# Patient Record
(demographics unavailable — no encounter records)

---

## 2025-03-06 NOTE — HISTORY OF PRESENT ILLNESS
[FreeTextEntry1] : Franny is [id number] female Factor V, preeclampsia at 24 weeks years ago now 13 weeks gestation via IVF. Gained over 10 pounds so far. Starting to go to the gym and eat healthier. NBP home 120s

## 2025-03-06 NOTE — DISCUSSION/SUMMARY
[FreeTextEntry1] : The patient is a 41-year-old female Factor V, preeclampsia at 24 weeks 4 years ago now 13 weeks gestation who is doing well. #1 CV- nl ECG, ECHO and exercise stress test normal  #2 HTN- blood pressure normal #3 Heme- c/w aspirin for both Factor V and preeclampsia #4 Ob- 13 weeks gestation, c/w prenatal, aspirin, folic acid

## 2025-03-06 NOTE — HISTORY OF PRESENT ILLNESS
[FreeTextEntry1] : Franny is [id number] female Factor V, preeclampsia at 24 weeks years ago now 13 weeks gestation via IVF. Gained over 10 pounds so far. Starting to go to the gym and eat healthier. NBP home 120s

## 2025-03-06 NOTE — HISTORY OF PRESENT ILLNESS
[FreeTextEntry1] : Franny is [id number] female Factor V, preeclampsia at 24 weeks years ago now 13 weeks gestation via IVF. Gained over 10 pounds so far. Starting to go to the gym and eat healthier. NBP home 120s

## 2025-03-11 NOTE — ASSESSMENT
[FreeTextEntry1] : #HCM - Patient presenting to [facility] care, had annual CPE 11/2024 - Received flu vaccine this season - Up to date with pap smear - Labs recently checked with OBGYN no need to repeat at this time   #Low back pain - Reports discomfort from IVF injections in low back  - No abnormalities found on PE - Suspect MSK etiology given that this happens while being active could be increased back pain from pregnancy - Can use band to help off load   #Seasonal allergies - Can use OTC Claritin or Zyrtec

## 2025-03-11 NOTE — PHYSICAL EXAM
[No Acute Distress] : no acute distress [Well-Appearing] : well-appearing [Normal Outer Ear/Nose] : the outer ears and nose were normal in appearance [Normal Oropharynx] : the oropharynx was normal [No Lymphadenopathy] : no lymphadenopathy [Supple] : supple [Thyroid Normal, No Nodules] : the thyroid was normal and there were no nodules present [No Respiratory Distress] : no respiratory distress  [Clear to Auscultation] : lungs were clear to auscultation bilaterally [Normal Rate] : normal rate  [Regular Rhythm] : with a regular rhythm [Normal S1, S2] : normal S1 and S2 [No Spinal Tenderness] : no spinal tenderness [Grossly Normal Strength/Tone] : grossly normal strength/tone [No Rash] : no rash [Coordination Grossly Intact] : coordination grossly intact [No Focal Deficits] : no focal deficits [Normal Gait] : normal gait [Normal Affect] : the affect was normal [Normal Insight/Judgement] : insight and judgment were intact

## 2025-03-11 NOTE — HISTORY OF PRESENT ILLNESS
[FreeTextEntry1] : Establish care [de-identified] : 43yo female with PMH of Factor V Leiden mutation, preeclampsia who presents to the office to establish care.   Previously followed with PMD Dr. Crum, had physical 11/2024. Previous PMD retired.   Patient currently at 14 weeks gestation via IVF. Following with MFM and cardiologist.  Previous pregnancy complicated by preeclampsia, delivered at 26 weeks.   She is concerned about some linger injection site discomfort on her lower back from her IVF process. Notes that she feels like there is a "bruise." Notices when she is walking.  She reports worsening allergies. Inquiring what allergy medication to take.

## 2025-03-11 NOTE — REVIEW OF SYSTEMS
[Nasal Discharge] : nasal discharge [Postnasal Drip] : postnasal drip [Frequency] : frequency [Fever] : no fever [Chills] : no chills [Vision Problems] : no vision problems [Earache] : no earache [Sore Throat] : no sore throat [Chest Pain] : no chest pain [Palpitations] : no palpitations [Shortness Of Breath] : no shortness of breath [Cough] : no cough [Abdominal Pain] : no abdominal pain [Nausea] : no nausea [Constipation] : no constipation [Diarrhea] : diarrhea [Vomiting] : no vomiting [Dysuria] : no dysuria [Itching] : no itching [Mole Changes] : no mole changes [Skin Rash] : no skin rash [Headache] : no headache [Dizziness] : no dizziness

## 2025-03-11 NOTE — HEALTH RISK ASSESSMENT
[Good] : ~his/her~  mood as  good [No] : In the past 12 months have you used drugs other than those required for medical reasons? No [Patient reported PAP Smear was normal] : Patient reported PAP Smear was normal [With Family] : lives with family [Employed] : employed [] :  [# Of Children ___] : has [unfilled] children [Smoke Detector] : smoke detector [Carbon Monoxide Detector] : carbon monoxide detector [Seat Belt] :  uses seat belt [Sunscreen] : uses sunscreen [Never] : Never [de-identified] : walking with her dog; wants to increase activity by going to the gym  [de-identified] : balanced diet [Reports changes in hearing] : Reports no changes in hearing [Reports changes in vision] : Reports no changes in vision [Reports changes in dental health] : Reports no changes in dental health [PapSmearDate] : 12/24 [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

## 2025-03-14 NOTE — PHYSICAL EXAM
[Normal Breath Sounds] : Normal breath sounds [Normal Heart Sounds] : normal heart sounds [Alert] : alert [Oriented to Person] : oriented to person [Oriented to Place] : oriented to place [Oriented to Time] : oriented to time [Calm] : calm [de-identified] : WNL [de-identified] : WNL [de-identified] : MAYRAL [de-identified] : Soft, nontender, gravid uterus.  No palpable umbilical or incisional hernia. [de-identified] : WNL ROM [de-identified] : WNL

## 2025-03-14 NOTE — HISTORY OF PRESENT ILLNESS
[de-identified] : Franny is a 42 yr old female here for a consultation for Umbilical Hernia.   Is currently 15 weeks pregnant.   CT A+P on 7/9/21 - 3.6 cm complex left adnexal cyst with septations and rim calcification. Small amount of surrounding fluid. Recommend pelvic ultrasound. Trace fat-containing umbilical hernia. Ascending colonic wall thickening secondary to under distention or colitis.  Today pt reports no pain. Normal BMs, denies constipation or diarrhea. Denies nausea and vomiting. Good appetite. Doesnt see or feel a bulge and denies discomfort from area. Takes baby asprin.

## 2025-03-14 NOTE — PHYSICAL EXAM
[Normal Breath Sounds] : Normal breath sounds [Normal Heart Sounds] : normal heart sounds [Alert] : alert [Oriented to Person] : oriented to person [Oriented to Place] : oriented to place [Oriented to Time] : oriented to time [Calm] : calm [de-identified] : WNL [de-identified] : WNL [de-identified] : MAYRAL [de-identified] : Soft, nontender, gravid uterus.  No palpable umbilical or incisional hernia. [de-identified] : WNL ROM [de-identified] : WNL

## 2025-03-14 NOTE — ASSESSMENT
[FreeTextEntry1] : #Tick bite - Presenting for second opinion on tick bite - Walking through park yesterday evening, found tick on her abdomen this morning - Went to Urgent Care - tick removed and no retained particles  - Agree with Urgent Care provider, monitor off antibiotics given that tick was not present >24-48 hours and no engorgement - Look for target rash  - No doxycycline given patient is pregnant

## 2025-03-14 NOTE — REVIEW OF SYSTEMS
Moviprep Bowel prep - E-scribed to pharmacy.     [Fever] : no fever [Chills] : no chills [Joint Pain] : no joint pain [Itching] : no itching [Skin Rash] : no skin rash

## 2025-03-14 NOTE — ASSESSMENT
[FreeTextEntry1] : In summary the patient is 15 weeks pregnant with her second child.  She underwent an emergent  in .  She presents for evaluation for possible umbilical hernia.  There is no umbilical hernia on exam and she has no umbilical pain.  I reviewed her CT from .  There is a tiny fat-containing umbilical hernia.  I reassured her that this is not of clinical significance and should be left alone.  From my standpoint I only need to see her as needed.

## 2025-03-14 NOTE — CONSULT LETTER
[Dear  ___] : Dear  [unfilled], [Consult Letter:] : I had the pleasure of evaluating your patient, [unfilled]. [Please see my note below.] : Please see my note below. [Consult Closing:] : Thank you very much for allowing me to participate in the care of this patient.  If you have any questions, please do not hesitate to contact me. [Sincerely,] : Sincerely, [FreeTextEntry3] : Urban Mosher M.D., F.A.C.S, F.A.S.C.R.S

## 2025-03-14 NOTE — PHYSICAL EXAM
[No Acute Distress] : no acute distress [Well-Appearing] : well-appearing [No Rash] : no rash [Normal Affect] : the affect was normal [Normal Insight/Judgement] : insight and judgment were intact

## 2025-03-14 NOTE — HISTORY OF PRESENT ILLNESS
[de-identified] : Franny is a 42 yr old female here for a consultation for Umbilical Hernia.   Is currently 15 weeks pregnant.   CT A+P on 7/9/21 - 3.6 cm complex left adnexal cyst with septations and rim calcification. Small amount of surrounding fluid. Recommend pelvic ultrasound. Trace fat-containing umbilical hernia. Ascending colonic wall thickening secondary to under distention or colitis.  Today pt reports no pain. Normal BMs, denies constipation or diarrhea. Denies nausea and vomiting. Good appetite. Doesnt see or feel a bulge and denies discomfort from area. Takes baby asprin.

## 2025-05-06 NOTE — HISTORY OF PRESENT ILLNESS
[FreeTextEntry1] : 42-year-old female Factor V, preeclampsia at 24 weeks 4 years ago now 23 weeks gestation. She is going to the gym. Hydrating. So far feels well. Nervous.  supportive.

## 2025-05-06 NOTE — DISCUSSION/SUMMARY
[FreeTextEntry1] : The patient is a 42-year-old female Factor V, preeclampsia at 24 weeks 4 years ago now 23 weeks gestation who is doing well. #1 CV- nl ECG, ECHO and exercise stress test normal  #2 HTN- blood pressure normal #3 Heme- c/w aspirin for both Factor V and preeclampsia #4 Ob- 23 weeks gestation, c/w prenatal, aspirin, folic acid, continue exercise at gym [EKG obtained to assist in diagnosis and management of assessed problem(s)] : EKG obtained to assist in diagnosis and management of assessed problem(s)

## 2025-05-21 NOTE — PHYSICAL EXAM
[No Acute Distress] : no acute distress [No Respiratory Distress] : no respiratory distress  [Clear to Auscultation] : lungs were clear to auscultation bilaterally [Normal Rate] : normal rate  [Regular Rhythm] : with a regular rhythm [Normal S1, S2] : normal S1 and S2 [Normal Affect] : the affect was normal [Normal Insight/Judgement] : insight and judgment were intact [de-identified] : bilateral TM effusions, injected pharynx without exudate, post nasal drip present, nontender sinuses

## 2025-05-21 NOTE — HISTORY OF PRESENT ILLNESS
[FreeTextEntry8] : 43yo female presenting to the office complaining of cold symptoms.  Reports symptoms for the past three days. Thought it was due to allergies, but feels worsened congestion and wanted to be assessed as she is pregnant and unsure what she can take.  Son sick with similar symptoms. No fever or chills.  Reports cough only at night.  At the beginning of the illness, felt ear pressure and popping with dizziness. 
none required

## 2025-05-21 NOTE — REVIEW OF SYSTEMS
[Earache] : earache [Nasal Discharge] : nasal discharge [Sore Throat] : sore throat [Cough] : cough [Headache] : headache [Dizziness] : dizziness [Fever] : no fever [Chills] : no chills [Shortness Of Breath] : no shortness of breath

## 2025-05-21 NOTE — ASSESSMENT
[FreeTextEntry1] : #Acute URI - Patient presenting with sinus congestion, rhinorrhea, cough for the past three days - Likely viral vs allergic etiology given above symptoms - Recommend supportive care with plenty of fluids, nasal saline spray, oral antihistamine - Discussed pregnancy-safe treatment plan - Patient instructed to call the office if no improvement or worsening symptoms over the next few days

## 2025-06-03 NOTE — HISTORY OF PRESENT ILLNESS
[FreeTextEntry1] : 42-year-old female Factor V, preeclampsia at 24 weeks 4 years ago now 26.6 weeks gestation who is feeling well except for allergies. Glucose in two weeks. Baby doing well.

## 2025-06-03 NOTE — DISCUSSION/SUMMARY
[FreeTextEntry1] : The patient is a 42-year-old female Factor V, preeclampsia at 24 weeks 4 years ago now 26.6 weeks gestation who is doing well. #1 CV- nl ECG, ECHO and exercise stress test normal  #2 HTN- blood pressure normal #3 Heme- c/w aspirin for both Factor V and preeclampsia #4 Ob- 26.6 weeks gestation, c/w prenatal, aspirin, folic acid, continue exercise at gym [EKG obtained to assist in diagnosis and management of assessed problem(s)] : EKG obtained to assist in diagnosis and management of assessed problem(s)

## 2025-07-01 NOTE — HISTORY OF PRESENT ILLNESS
[FreeTextEntry1] : 42-year-old female Factor V, preeclampsia at 22 weeks 4 years ago now 30.6 weeks gestation. Now gestational diabetes.  25. Otherwise doing walks for exercise and baby more active when she moves more. Diet change and seeing nutritionist.

## 2025-07-01 NOTE — DISCUSSION/SUMMARY
[FreeTextEntry1] : The patient is a 42-year-old female Factor V, preeclampsia at 22 weeks 4 years ago now 30.6 weeks gestation with gestational diabetes. #1 CV- nl ECG, ECHO and exercise stress test normal  #2 HTN- blood pressure normal #3 Heme- c/w aspirin for both Factor V and preeclampsia #4 DM- diet controlled currently #5 Ob- 30.6 weeks gestation, c/w prenatal, aspirin, folic acid, continue exercise at gym,  25 [EKG obtained to assist in diagnosis and management of assessed problem(s)] : EKG obtained to assist in diagnosis and management of assessed problem(s)

## 2025-07-29 NOTE — HISTORY OF PRESENT ILLNESS
[FreeTextEntry1] : 42-year-old female Factor V, preeclampsia at 22 weeks 4 years ago now 34.6 weeks gestation with gestational diabetes. Now on higher dose insulin. headaches after the insulin which are different than from preeclampsia

## 2025-07-29 NOTE — DISCUSSION/SUMMARY
[FreeTextEntry1] : The patient is a 42-year-old female Factor V, preeclampsia at 22 weeks 4 years ago now 34.6 weeks gestation with gestational diabetes. #1 CV- nl ECG, ECHO and exercise stress test normal  #2 HTN- blood pressure normal #3 Heme- c/w aspirin for both Factor V and preeclampsia #4 DM- on higher dose insulin #5 Ob- 34.6 weeks gestation, c/w prenatal, aspirin, folic acid, continue exercise at gym,  25 [EKG obtained to assist in diagnosis and management of assessed problem(s)] : EKG obtained to assist in diagnosis and management of assessed problem(s)